# Patient Record
Sex: FEMALE | Race: WHITE | Employment: FULL TIME | URBAN - METROPOLITAN AREA
[De-identification: names, ages, dates, MRNs, and addresses within clinical notes are randomized per-mention and may not be internally consistent; named-entity substitution may affect disease eponyms.]

---

## 2017-03-14 ENCOUNTER — OFFICE VISIT - RIVER FALLS (OUTPATIENT)
Dept: FAMILY MEDICINE | Facility: CLINIC | Age: 47
End: 2017-03-14

## 2017-03-14 ASSESSMENT — MIFFLIN-ST. JEOR: SCORE: 1457.77

## 2021-05-25 ENCOUNTER — RECORDS - HEALTHEAST (OUTPATIENT)
Dept: ADMINISTRATIVE | Facility: CLINIC | Age: 51
End: 2021-05-25

## 2021-09-14 ENCOUNTER — OFFICE VISIT - RIVER FALLS (OUTPATIENT)
Dept: FAMILY MEDICINE | Facility: CLINIC | Age: 51
End: 2021-09-14
Payer: COMMERCIAL

## 2021-09-29 ENCOUNTER — OFFICE VISIT - RIVER FALLS (OUTPATIENT)
Dept: FAMILY MEDICINE | Facility: CLINIC | Age: 51
End: 2021-09-29
Payer: COMMERCIAL

## 2021-10-01 ENCOUNTER — COMMUNICATION - RIVER FALLS (OUTPATIENT)
Dept: FAMILY MEDICINE | Facility: CLINIC | Age: 51
End: 2021-10-01
Payer: COMMERCIAL

## 2021-10-01 LAB — BACTERIA SPEC CULT: NORMAL

## 2021-12-09 ENCOUNTER — OFFICE VISIT - RIVER FALLS (OUTPATIENT)
Dept: FAMILY MEDICINE | Facility: CLINIC | Age: 51
End: 2021-12-09
Payer: COMMERCIAL

## 2021-12-22 ENCOUNTER — OFFICE VISIT - RIVER FALLS (OUTPATIENT)
Dept: FAMILY MEDICINE | Facility: CLINIC | Age: 51
End: 2021-12-22
Payer: COMMERCIAL

## 2022-02-11 VITALS
BODY MASS INDEX: 27.12 KG/M2 | WEIGHT: 153.1 LBS | DIASTOLIC BLOOD PRESSURE: 80 MMHG | DIASTOLIC BLOOD PRESSURE: 75 MMHG | BODY MASS INDEX: 26.94 KG/M2 | HEART RATE: 64 BPM | WEIGHT: 152.1 LBS | HEART RATE: 86 BPM | SYSTOLIC BLOOD PRESSURE: 131 MMHG | OXYGEN SATURATION: 97 % | SYSTOLIC BLOOD PRESSURE: 114 MMHG | TEMPERATURE: 98.5 F

## 2022-02-11 VITALS
HEART RATE: 76 BPM | HEART RATE: 92 BPM | BODY MASS INDEX: 26.39 KG/M2 | DIASTOLIC BLOOD PRESSURE: 82 MMHG | DIASTOLIC BLOOD PRESSURE: 91 MMHG | SYSTOLIC BLOOD PRESSURE: 138 MMHG | SYSTOLIC BLOOD PRESSURE: 126 MMHG | BODY MASS INDEX: 25.31 KG/M2 | TEMPERATURE: 99.1 F | WEIGHT: 149 LBS | WEIGHT: 142.9 LBS

## 2022-02-11 VITALS
HEIGHT: 63 IN | WEIGHT: 191.5 LBS | SYSTOLIC BLOOD PRESSURE: 110 MMHG | BODY MASS INDEX: 33.93 KG/M2 | TEMPERATURE: 97.4 F | HEART RATE: 72 BPM | DIASTOLIC BLOOD PRESSURE: 70 MMHG

## 2022-02-16 NOTE — NURSING NOTE
Comprehensive Intake Entered On:  12/9/2021 9:26 AM CST    Performed On:  12/9/2021 9:20 AM CST by Eileen Brady CMA               Summary   Chief Complaint :   c/o burning with urination, lower abdominal discomfort x 4 days   Menstrual Status :   Menarcheal   Weight Measured :   149 lb(Converted to: 149 lb 0 oz, 67.585 kg)    Systolic Blood Pressure :   126 mmHg   Diastolic Blood Pressure :   82 mmHg (HI)    Mean Arterial Pressure :   97 mmHg   Peripheral Pulse Rate :   76 bpm   BP Site :   Right arm   BP Method :   Manual   HR Method :   Electronic   Languages :   English   Eileen Brady CMA - 12/9/2021 9:20 AM CST   Health Status   Allergies Verified? :   Yes   Medication History Verified? :   Yes   Medical History Verified? :   Yes   Pre-Visit Planning Status :   Completed   Tobacco Use? :   Former smoker   Eileen Brady CMA - 12/9/2021 9:20 AM CST   Consents   Consent for Immunization Exchange :   Consent Granted   Consent for Immunizations to Providers :   Consent Granted   Eileen Brady CMA - 12/9/2021 9:20 AM CST   Meds / Allergies   (As Of: 12/9/2021 9:26:39 AM CST)   Allergies (Active)   No known allergies  Estimated Onset Date:   Unspecified ; Created By:   Annalisa Baum; Reaction Status:   Active ; Category:   Drug ; Substance:   No known allergies ; Type:   Allergy ; Updated By:   Annalisa Baum; Reviewed Date:   9/29/2021 3:26 PM CDT        Medication List   (As Of: 12/9/2021 9:26:39 AM CST)   Prescription/Discharge Order    FLUoxetine  :   FLUoxetine ; Status:   Prescribed ; Ordered As Mnemonic:   FLUoxetine 20 mg oral capsule ; Simple Display Line:   1 cap(s), Oral, daily, 30 cap(s), 0 Refill(s) ; Ordering Provider:   Kushal Montenegro PA-C; Catalog Code:   FLUoxetine ; Order Dt/Tm:   10/12/2021 6:58:55 AM CDT

## 2022-02-16 NOTE — LETTER
(Inserted Image. Unable to display)   October 13, 2021    JAS BERMAN   [address] [place] [address]            Dear JAS,      Thank you for selecting [facility] for your healthcare needs.    Our records indicate you are due for the following services:     Follow-up office visit.    (FYI   Regarding office visits: In some instances, a video visit or telephone visit may be offered as an option.)      To schedule an appointment or if you have further questions, please contact your clinic at [phone].      Powered by [employer]    Sincerely,    Kushal Montenegro PA-C

## 2022-02-16 NOTE — TELEPHONE ENCOUNTER
---------------------  From: Juan Pablo Lares MD   To: JAS BERMAN    Sent: 9/29/2021 4:19:17 PM CDT  Subject: Initial urine     Dear Jas    Urine shows RBC (Red Blood cells) 0-2, WBC (white blood cells) 0-5 and few bacteria.    Angel Lares M.D.

## 2022-02-16 NOTE — TELEPHONE ENCOUNTER
Entered by Kushal Montenegro PA-C on October 12, 2021 6:59:00 AM CDT  ---------------------  From: Kushal Montenegro PA-C   To: [facility] #[id number]    Sent: 10/12/2021 6:59:00 AM CDT  Subject: Medication Management     ** Submitted: **  Complete:FLUoxetine (FLUoxetine 20 mg oral capsule)   Signed by Kushal Montenegro PA-C  10/12/2021 11:58:00 AM [facility]    ** Approved with modifications: **  FLUoxetine (FLUOXETINE 20MG CAPSULES)  TAKE ONE CAPSULE BY MOUTH EVERY DAY  Qty:  30 cap(s)        Days Supply:  30        Refills:  0          Substitutions Allowed     Route To Pharmacy - [facility] #[id number]   Note to Pharmacy:  Needs virtual visit              ------------------------------------------  From: [facility] #[id number]  To: Kushal Montenegro PA-C  Sent: October 12, 2021 3:42:06 AM CDT  Subject: Medication Management  Due: October 2, 2021 3:22:27 PM CDT     ** On Hold Pending Signature **     Dispensed Drug: FLUoxetine (FLUoxetine 20 mg oral capsule), TAKE ONE CAPSULE BY MOUTH EVERY DAY  Quantity: 30 cap(s)  Days Supply: 30  Refills: 0  Substitutions Allowed  Notes from Pharmacy:  ------------------------------------------

## 2022-02-16 NOTE — NURSING NOTE
Depression Screening Entered On:  9/14/2021 3:11 PM CDT    Performed On:  9/14/2021 3:10 PM CDT by Eileen Brady CMA               Depression Screening   Little Interest - Pleasure in Activities :   Nearly every day   Feeling Down, Depressed, Hopeless :   More than half the days   Initial Depression Screen Score :   5 Score   Poor Appetite or Overeating :   More than half the days   Trouble Falling or Staying Asleep :   Several days   Feeling Tired or Little Energy :   More than half the days   Feeling Bad About Yourself :   Several days   Trouble Concentrating :   Nearly every day   Moving or Speaking Slowly :   Not at all   Thoughts Better Off Dead or Hurting Self :   Not at all   Difficulty at Work, Home, Getting Along :   Somewhat difficult   Detailed Depression Screen Score :   9    Total Depression Screen Score :   14    Eileen Brady CMA - 9/14/2021 3:10 PM CDT

## 2022-02-16 NOTE — NURSING NOTE
Comprehensive Intake Entered On:  9/29/2021 3:28 PM CDT    Performed On:  9/29/2021 3:24 PM CDT by Samra Kimble               Summary   Chief Complaint :   c/o abd pain, urge to urinate, foul smelling urine- started this am    Menstrual Status :   Menarcheal   Weight Measured :   152.1 lb(Converted to: 152 lb 2 oz, 68.991 kg)    Systolic Blood Pressure :   114 mmHg   Diastolic Blood Pressure :   80 mmHg   Mean Arterial Pressure :   91 mmHg   Peripheral Pulse Rate :   64 bpm   BP Site :   Right arm   BP Method :   Manual   HR Method :   Electronic   Temperature Tympanic :   98.5 DegF(Converted to: 36.9 DegC)    Oxygen Saturation :   97 %   Languages :   English   Samra Kimble - 9/29/2021 3:24 PM CDT   Health Status   Allergies Verified? :   Yes   Medication History Verified? :   Yes   Medical History Verified? :   Yes   Pre-Visit Planning Status :   Completed   Tobacco Use? :   Former smoker   Samra Kimble - 9/29/2021 3:24 PM CDT   Consents   Consent for Immunization Exchange :   Consent Granted   Consent for Immunizations to Providers :   Consent Granted   Samra Kimble - 9/29/2021 3:24 PM CDT   Meds / Allergies   (As Of: 9/29/2021 3:28:49 PM CDT)   Allergies (Active)   No known allergies  Estimated Onset Date:   Unspecified ; Created By:   Annalisa Baum; Reaction Status:   Active ; Category:   Drug ; Substance:   No known allergies ; Type:   Allergy ; Updated By:   Annalisa Baum; Reviewed Date:   9/29/2021 3:26 PM CDT        Medication List   (As Of: 9/29/2021 3:28:49 PM CDT)   Prescription/Discharge Order    FLUoxetine  :   FLUoxetine ; Status:   Prescribed ; Ordered As Mnemonic:   FLUoxetine 20 mg oral capsule ; Simple Display Line:   20 mg, 1 cap(s), Oral, daily, 30 cap(s), 0 Refill(s) ; Ordering Provider:   Kushal Montenegro PA-C; Catalog Code:   FLUoxetine ; Order Dt/Tm:   9/15/2021 12:39:49 PM CDT

## 2022-02-16 NOTE — NURSING NOTE
Comprehensive Intake Entered On:  12/22/2021 2:28 PM CST    Performed On:  12/22/2021 2:25 PM CST by Eileen Brady CMA               Summary   Temperature Tympanic :   99.1 DegF(Converted to: 37.3 DegC)    Eileen Brady CMA - 12/22/2021 2:30 PM CST   Chief Complaint :   Swollen under chin since Saturday- painful; reoccuring 2-3 x a year; discuss fluoxetine   Menstrual Status :   Menarcheal   Weight Measured :   142.9 lb(Converted to: 142 lb 14 oz, 64.818 kg)    Systolic Blood Pressure :   138 mmHg (HI)    Diastolic Blood Pressure :   91 mmHg (HI)    Mean Arterial Pressure :   107 mmHg   Peripheral Pulse Rate :   92 bpm   BP Site :   Right arm   BP Method :   Electronic   HR Method :   Electronic   Languages :   English   Eileen Brady CMA - 12/22/2021 2:25 PM CST   Health Status   Allergies Verified? :   Yes   Medication History Verified? :   Yes   Medical History Verified? :   Yes   Eileen Brady CMA - 12/22/2021 2:25 PM CST   Consents   Consent for Immunization Exchange :   Consent Granted   Consent for Immunizations to Providers :   Consent Granted   Eileen Brady CMA - 12/22/2021 2:25 PM CST   Meds / Allergies   (As Of: 12/22/2021 2:28:03 PM CST)   Allergies (Active)   No known allergies  Estimated Onset Date:   Unspecified ; Created By:   Annalisa Baum; Reaction Status:   Active ; Category:   Drug ; Substance:   No known allergies ; Type:   Allergy ; Updated By:   Annalisa Baum; Reviewed Date:   12/22/2021 2:26 PM CST        Medication List   (As Of: 12/22/2021 2:28:03 PM CST)   Prescription/Discharge Order    FLUoxetine  :   FLUoxetine ; Status:   Prescribed ; Ordered As Mnemonic:   FLUoxetine 20 mg oral capsule ; Simple Display Line:   1 cap(s), Oral, daily, 30 cap(s), 0 Refill(s) ; Ordering Provider:   Kushal Montenegro PA-C; Catalog Code:   FLUoxetine ; Order Dt/Tm:   10/12/2021 6:58:55 AM CDT

## 2022-02-16 NOTE — PROGRESS NOTES
Patient:   JAS BERMAN            MRN: [id number]            FIN: [id number]               Age:   46 years     Sex:  Female     :  1970   Associated Diagnoses:   Panic attacks; CAITLIN (generalized anxiety disorder); GERD (gastroesophageal reflux disease)   Author:   Kushal Montenegro PA-C      Visit Information   Visit type:  General concerns.    Accompanied by:  No one.    Source of history:  Self.    Referral source:  Self.       Chief Complaint   3/14/2017 2:35 PM CDT    c/o increased anxiety  attacks x 2 months        History of Present Illness             The patient presents with anxiety.  The anxiety is characterized by irritability, nervousness and restlessness.  The severity of the anxiety is moderate.  The anxiety is episodic, fluctuates in intensity and is worsening.  The anxiety has lasted for 2 week(s).  Long standing history of anxiety. More GERD symptoms as of late. Just located her birth father and will be meeting him soon. GYN has her on low dose fluoxetine and uses PRN Ativan. No longer taking PPI. CC above noted and confirmed with the patient..        Review of Systems   Constitutional:  Negative.    Gastrointestinal:  Negative except as documented in history of present illness.    Psychiatric:  Negative except as documented in history of present illness.       Health Status   Allergies:    Allergic Reactions (All)  No known allergies   Medications:  (Selected)   Prescriptions  Prescribed  LORazepam 0.5 mg oral tablet: 1 tab(s) ( 0.5 mg ), PO, bid, PRN: for anxiety, # 30 tab(s), 0 Refill(s), Type: Maintenance  NexIUM 40 mg oral delayed release capsule: 1 cap(s) ( 40 mg ), PO, Daily, # 90 cap(s), 3 Refill(s), Type: Maintenance, Pharmacy: [facility] #[id number], 1 cap(s) po daily  Documented Medications  Documented  FLUoxetine 20 mg oral capsule: 1 cap(s) ( 20 mg ), po, hs, 0 Refill(s), Type: Maintenance   Problem list:    All Problems (Selected)  HISTORY OF TOBACCO USE / ICD-9-CM V15.82 /  Confirmed  Obese / ICD-9-.00 / Probable  Panic attacks / SNOMED CT 059707802 / Confirmed  Depression / SNOMED CT 028793869 / Confirmed      Histories   Past Medical History:    Active  HISTORY OF TOBACCO USE (V15.82)  Resolved  Moderate recurrent major depression (296.32):  Resolved.   Family History:    No family history items have been selected or recorded.   Procedure history:    stress test in the month of 2007 at 36 Years.  Comments:  2010 11:48 AM - Christa Larson  negative   delivery - delivered ([id number]).   x 2.   Social History:        Tobacco Assessment: Past      Home and Environment Assessment            Marital status: .  Spouse/Partner name: Allen - second marriage.        Physical Examination   Vital Signs   3/14/2017 2:35 PM CDT Temperature Temporal 97.4 DegF    Peripheral Pulse Rate 72 bpm    Systolic Blood Pressure 110 mmHg    Diastolic Blood Pressure 70 mmHg    Mean Arterial Pressure 83 mmHg    BP Site Left arm    BP Method Manual      Measurements from flowsheet : Measurements   3/14/2017 2:35 PM CDT Height Measured - Standard 63 in    Weight Measured - Standard 191.5 lb    BSA 1.96 m2    Body Mass Index 33.92 kg/m2      Neck:  Supple, Non-tender, No lymphadenopathy.    Respiratory:  Lungs are clear to auscultation, Respirations are non-labored, Breath sounds are equal.    Cardiovascular:  Normal rate, Regular rhythm, No murmur.    Gastrointestinal:  Soft, Non-tender, Non-distended, Normal bowel sounds, No organomegaly.    Psychiatric:  Cooperative.         Mood and affect: Anxious.         Behavior: Agitated.         Judgment: Able to make sensible decisions, Appropriate in social situations.         Abnormal / Psychotic thoughts: No homicidal ideation, No suicidal ideation.         Thought process: Appropriate.       Impression and Plan   Diagnosis     Panic attacks (PCF46-SM F41.0).     CAITLIN (generalized anxiety disorder) (MDT79-AR F41.1).     GERD  (gastroesophageal reflux disease) (DXY54-ST K21.9).     Patient Instructions:       Counseled: Patient, Regarding diagnosis, Regarding treatment, Regarding medications, Verbalized understanding.    Orders     Orders (Selected)   Prescriptions  Prescribed  NexIUM 40 mg oral delayed release capsule: 1 cap(s) ( 40 mg ), PO, Daily, # 90 cap(s), 3 Refill(s), Type: Maintenance, Pharmacy: [facility] #[id number], 1 cap(s) po daily.     Back on Nexium. She can use Ativan PRN. She sees GYN on Thursday. Wants to wait on psych Meds until after that visit. May consider putting her on Buspar in addition to current Meds if needed.

## 2022-02-16 NOTE — PROGRESS NOTES
Patient:   JAS BERMAN            MRN: [id number]            FIN: [id number]               Age:   51 years     Sex:  Female     :  1970   Associated Diagnoses:   Localized swelling, mass and lump, neck; Depression   Author:   Kushal Montenegro PA-C      Visit Information      Date of Service: 2021 02:19 pm  Performing Location: [facility]  Encounter#: [id number]      Primary Care Provider (PCP):  Kushal Montenegro PA-C    NPI# [id number]   Visit type:  General concerns.    Accompanied by:  No one.    Source of history:  Self.    Referral source:  Self.    History limitation:  None.       Chief Complaint   2021 2:25 PM CST   Swollen under chin since Saturday- painful; reoccuring 2-3 x a year; discuss fluoxetine        History of Present Illness             The patient presents with neck pain.  The location of the neck pain is midline, anterior.  The neck pain is described as aching and sharp.  The severity of the neck pain is moderate.  The neck pain is episodic, fluctuates in intensity and is improving.  The neck pain has lasted for 3 day(s).  Swelling in midline under chin. Better today. No fever or chills. Happens 2-3 times per year. Had US many years ago, told had thyroid nodule, BX benign. This swelling is superior to the thyroid. Odynophagia. No dysphagia. No rash, but some localized erythema. This is first time I have seen this patient for this condition. CC above noted and confirmed with the patient..  Refill Fluoxetine.        Review of Systems   Constitutional:  Negative.    Eye:  Negative.    Ear/Nose/Mouth/Throat:  Negative except as documented in history of present illness.    Respiratory:  Negative.    Cardiovascular:  Negative.    Gastrointestinal:  Negative.    Psychiatric:  Anxiety.       Health Status   Allergies:    Allergic Reactions (Selected)  No known allergies   Medications:  (Selected)   Prescriptions  Prescribed  FLUoxetine 20 mg oral capsule: = 1 cap(s), Oral,  daily, x 90 day(s), # 90 cap(s), 3 Refill(s), Type: Physician Stop, Pharmacy: [facility] #[id number], 1 cap(s) Oral daily,x90 day(s), 142.9, lb, 21 14:25:00 CST, Weight Measured  amoxicillin-clavulanate 875 mg-125 mg oral tablet: = 1 tab(s), Oral, q12 hrs, x 10 day(s), # 20 tab(s), 0 Refill(s), Type: Acute, Pharmacy: [facility] #[id number], 1 tab(s) Oral q12 hrs,x10 day(s), 142.9, lb, 21 14:25:00 CST, Weight Measured   Problem list:    All Problems (Selected)  Panic attacks / SNOMED CT 919019443 / Confirmed  Obese / ICD-9-.00 / Probable  HISTORY OF TOBACCO USE / ICD-9-CM V15.82 / Confirmed  Depression / SNOMED CT 308074764 / Confirmed      Histories   Past Medical History:    Active  HISTORY OF TOBACCO USE (V15.82)  Resolved  Moderate recurrent major depression (296.32):  Resolved.   Family History:    No family history items have been selected or recorded.   Procedure history:    stress test in the month of 2007 at 36 Years.  Comments:  2010 11:48 AM [facility] - Christa Larson  negative   delivery - delivered ([id number]).   x 2.   Social History:        Electronic Cigarette/Vaping Assessment            Electronic Cigarette Use: Never.      Tobacco Assessment: Past            Former smoker, quit more than 30 days ago      Home and Environment Assessment            Marital status: .  Spouse/Partner name: Allen - second marriage.        Physical Examination   Vital Signs   2021 2:25 PM CST Temperature Tympanic 99.1 DegF    Peripheral Pulse Rate 92 bpm    HR Method Electronic    Systolic Blood Pressure 138 mmHg  HI    Diastolic Blood Pressure 91 mmHg  HI    Mean Arterial Pressure 107 mmHg    BP Site Right arm    BP Method Electronic      Measurements from flowsheet : Measurements   2021 2:25 PM CST   Weight Measured - Standard                142.9 lb     HENT:  Tympanic membranes are clear, Oral mucosa is moist, No pharyngeal erythema.    Neck:  Supple,  Swelling right under chin in midline. Tender to touch. Mild erythema., No other swollen nodes in neck.    Respiratory:  Lungs are clear to auscultation, Respirations are non-labored.    Cardiovascular:  Normal rate, Regular rhythm.       Impression and Plan   Diagnosis     Localized swelling, mass and lump, neck (AGI16-KG R22.1).     Depression (CDM34-EJ F33.1).     Course:  Improving.    Patient Instructions:       Counseled: Patient, Regarding diagnosis, Regarding medications, Activity, Verbalized understanding.    Summary:  Rocephin 1 gram IM. To ED if problems swallowing, difficulty breathing or other concerns of fever, chills, etc..    Orders     Orders (Selected)   Outpatient Orders  Ordered  Referral (Request): 12/22/21 14:42:00 CST, Referred to: Otolaryngology (ENT), Referred to: [facility], Reason for referral: Neck swelling recurrent, Priority: Soon, Localized swelling, mass and lump, neck  US Head/Neck Soft Tissue (Request): Priority: Urgent, Localized swelling, mass and lump, neck  Prescriptions  Prescribed  FLUoxetine 20 mg oral capsule: = 1 cap(s), Oral, daily, x 90 day(s), # 90 cap(s), 3 Refill(s), Type: Physician Stop, Pharmacy: [facility] #[id number], 1 cap(s) Oral daily,x90 day(s), 142.9, lb, 12/22/21 14:25:00 CST, Weight Measured  amoxicillin-clavulanate 875 mg-125 mg oral tablet: = 1 tab(s), Oral, q12 hrs, x 10 day(s), # 20 tab(s), 0 Refill(s), Type: Acute, Pharmacy: [facility] #[id number], 1 tab(s) Oral q12 hrs,x10 day(s), 142.9, lb, 12/22/21 14:25:00 CST, Weight Measured.

## 2022-02-16 NOTE — NURSING NOTE
Comprehensive Intake Entered On:  9/14/2021 2:49 PM CDT    Performed On:  9/14/2021 2:45 PM CDT by Eileen Brady CMA               Summary   Chief Complaint :   Discuss restarting Fluoxetine   Menstrual Status :   Menarcheal   Weight Measured :   153.1 lb(Converted to: 153 lb 2 oz, 69.445 kg)    Systolic Blood Pressure :   131 mmHg (HI)    Diastolic Blood Pressure :   75 mmHg   Mean Arterial Pressure :   94 mmHg   Peripheral Pulse Rate :   86 bpm   BP Site :   Right arm   BP Method :   Electronic   HR Method :   Electronic   Languages :   English   Eileen Brady CMA - 9/14/2021 2:45 PM CDT   Health Status   Allergies Verified? :   Yes   Medication History Verified? :   Yes   Medical History Verified? :   Yes   Eileen Brady CMA - 9/14/2021 2:45 PM CDT   Consents   Consent for Immunization Exchange :   Consent Granted   Consent for Immunizations to Providers :   Consent Granted   Eileen Brady CMA - 9/14/2021 2:45 PM CDT   Meds / Allergies   (As Of: 9/14/2021 2:49:09 PM CDT)   Allergies (Active)   No known allergies  Estimated Onset Date:   Unspecified ; Created By:   Annalisa Baum; Reaction Status:   Active ; Category:   Drug ; Substance:   No known allergies ; Type:   Allergy ; Updated By:   Annalisa Baum; Reviewed Date:   9/14/2021 2:48 PM CDT        Medication List   (As Of: 9/14/2021 2:49:09 PM CDT)   Prescription/Discharge Order    raNITIdine  :   raNITIdine ; Status:   Processing ; Ordered As Mnemonic:   Zantac 300 oral tablet ; Ordering Provider:   Kushal Montenegro PA-C; Action Display:   Complete ; Catalog Code:   raNITIdine ; Order Dt/Tm:   9/14/2021 2:48:07 PM CDT          LORazepam  :   LORazepam ; Status:   Processing ; Ordered As Mnemonic:   LORazepam 0.5 mg oral tablet ; Ordering Provider:   Mitchel Pena MD; Action Display:   Complete ; Catalog Code:   LORazepam ; Order Dt/Tm:   9/14/2021 2:48:07 PM CDT            Home Meds    FLUoxetine  :   FLUoxetine ; Status:   Processing ; Ordered As  Mnemonic:   FLUoxetine 20 mg oral capsule ; Action Display:   Complete ; Catalog Code:   FLUoxetine ; Order Dt/Tm:   9/14/2021 2:48:07 PM CDT            Social History   Social History   (As Of: 9/14/2021 2:49:09 PM CDT)   Tobacco:  Past      Former smoker, quit more than 30 days ago   (Last Updated: 9/14/2021 2:47:58 PM CDT by Eileen Brady CMA)          Electronic Cigarette/Vaping:        Electronic Cigarette Use: Never.   (Last Updated: 9/14/2021 2:48:03 PM CDT by Eileen Brady CMA)          Home/Environment:        Marital status: .  Spouse/Partner name: Allen - second marriage.   (Last Updated: 10/7/2014 6:31:50 AM CDT by Bisi Mesa)

## 2022-02-16 NOTE — PROGRESS NOTES
Patient:   JAS BERMAN            MRN: [id number]            FIN: [id number]               Age:   51 years     Sex:  Female     :  1970   Associated Diagnoses:   Acute cystitis with hematuria   Author:   Kushal Montenegro PA-C      Visit Information      Date of Service: 2021 09:20 am  Performing Location: [facility]  Encounter#: [id number]   Visit type:  New symptom.    Accompanied by:  No one.    Source of history:  Self.    History limitation:  None.       Chief Complaint   2021 9:20 AM CST    c/o burning with urination, lower abdominal discomfort x 4 days      History of Present Illness             The patient presents with dysuria.  The dysuria is characterized by burning and a sensation of bladder fullness.  The severity of the dysuria is moderate.  The dysuria is constant.  The dysuria has lasted for 4 day(s).  Continues  on Fluoxetine and feels much improved..  Associated symptoms consist of urinary frequency and urinary urgency.        Review of Systems   Constitutional:  Negative.    Gastrointestinal:  Negative.    Genitourinary:  Negative except as documented in history of present illness.       Health Status   Allergies:    Allergic Reactions (Selected)  No known allergies   Medications:  (Selected)   Prescriptions  Prescribed  FLUoxetine 20 mg oral capsule: = 1 cap(s), Oral, daily, # 30 cap(s), 0 Refill(s), Pharmacy: [facility] #[id number], TAKE ONE CAPSULE BY MOUTH EVERY DAY, 152.1, lb, 21 15:24:00 CDT, Weight Measured  Macrobid 100 mg oral capsule: = 1 cap(s) ( 100 mg ), Oral, bid, x 7 day(s), # 14 cap(s), 0 Refill(s), Type: Acute, Pharmacy: [facility] #[id number], 1 cap(s) Oral bid,x7 day(s), 149, lb, 21 9:20:00 CST, Weight Measured   Problem list:    All Problems  HISTORY OF TOBACCO USE / ICD-9-CM V15.82 / Confirmed  Obese / ICD-9-.00 / Probable  Panic attacks / SNOMED CT 994093469 / Confirmed  Depression / SNOMED CT 079013873 /  Confirmed  Inactive: History of thyroid nodule / SNOMED CT 234873651  Resolved: Moderate recurrent major depression / ICD-9-.32      Histories   Past Medical History:    Active  HISTORY OF TOBACCO USE (V15.82)  Resolved  Moderate recurrent major depression (296.32):  Resolved.   Family History:    No family history items have been selected or recorded.   Procedure history:    stress test in the month of 2007 at 36 Years.  Comments:  2010 11:48 AM CDT - Christa Larson  negative   delivery - delivered ([id number]).   x 2.   Social History:        Electronic Cigarette/Vaping Assessment            Electronic Cigarette Use: Never.      Tobacco Assessment: Past            Former smoker, quit more than 30 days ago      Home and Environment Assessment            Marital status: .  Spouse/Partner name: Allen - second marriage.        Physical Examination   Vital Signs   Measurements from flowsheet : Measurements   General:  Alert and oriented, No acute distress.    Respiratory:  Lungs are clear to auscultation, Respirations are non-labored, Breath sounds are equal.    Cardiovascular:  Normal rate, Regular rhythm.    Gastrointestinal:  Soft, Non-tender, Non-distended, Normal bowel sounds, No organomegaly.    Genitourinary:  No costovertebral angle tenderness.       Review / Management   Results review:  UA positive.       Impression and Plan   Diagnosis     Acute cystitis with hematuria (REV83-MH N30.01).     Course:  Progressing as expected.    Plan:  Macrobid BID x7 days. Continue drinking cranberry juice. RTC if sx worsen or persist and PRN Note composed by Paulo MCKINLEY. The history, exam were confirmed and performed by me. The assessment and plan were developed and confirmed by myself. .         Diet: Fluids encouraged.    Patient Instructions:       Counseled: Patient, Regarding diagnosis, Regarding treatment, Regarding medications, Regarding activity, Verbalized understanding.    Orders      Orders (Selected)   Prescriptions  Prescribed  Macrobid 100 mg oral capsule: = 1 cap(s) ( 100 mg ), Oral, bid, x 7 day(s), # 14 cap(s), 0 Refill(s), Type: Acute, Pharmacy: [facility] #[id number], 1 cap(s) Oral bid,x7 day(s), 149, lb, 12/09/21 9:20:00 CST, Weight Measured.

## 2022-02-16 NOTE — NURSING NOTE
Generalized Anxiety Disorder Screening Entered On:  9/14/2021 3:11 PM CDT    Performed On:  9/14/2021 3:10 PM CDT by Eileen Brady CMA               CAITLIN-7   CAITLIN Nervous, Anxious On Edge :   Nearly every day   CAITLIN Control Worrying B :   Nearly every day   CAITLIN Worrying Too Much :   Nearly every day   CAITLIN Trouble Relaxing :   Nearly every day   CAITLIN Restless :   More than half the days   CAITLIN Easily Annoyed/Irritable :   More than half the days   CAITLIN Afraid :   Nearly every day   CAITLIN Total Screening Score :   19    CAITLIN Difficulty with Work, Home, Others :   Somewhat difficult   Eileen Brady CMA - 9/14/2021 3:10 PM CDT

## 2022-02-16 NOTE — PROGRESS NOTES
Chief Complaint    c/o abd pain, urge to urinate, foul smelling urine- started this am  History of Present Illness       Feeling like something not right past 1-2 weeks. Last night started having discomfort in suprapubic area. Having foul smelling urine. Uncomfortable with urinating. Having urinary urgency and frequency.       Last UTI many years ago.  Review of Systems       No fevers       No vomiting       No vaginal itching, burning and discharge  Physical Exam   Vitals & Measurements    T: 98.5  F (Tympanic)  HR: 64 (Peripheral)  BP: 114/80  SpO2: 97%     WT: 152.1 lb        General: No acute distress       Musculoskeletal: Normal gait       Back: No CVA tenderness       Cardiac: Regular rate and rhythm       Abdomen: Soft and nondistended.  Mild tenderness in the suprapubic region       UA: RBC 0-2 with WBC 0-5 and few bacteria  Assessment/Plan       UTI: Treat with Keflex.  Await culture and return if not getting better.  Patient Information     Name:JAS BERMAN      Address:      [address] [id number]     Sex:Female     YOB: 1970     [phone]     MRN:[id number]     FIN:[id number]     Location:[facility]     Date of Service:2021      Primary Care Physician:       Kushal Montenegro PA-C, [phone]      Attending Physician:       Juan Pablo Lares MD, [phone]  Problem List/Past Medical History    Ongoing     Depression     History of thyroid nodule     HISTORY OF TOBACCO USE     Obese     Panic attacks    Historical     Moderate recurrent major depression  Procedure/Surgical History     stress test (2007)      Comments: negative.      delivery - delivered      x 2  Medications    FLUoxetine 20 mg oral capsule, 20 mg= 1 cap(s), Oral, daily  Allergies    No known allergies

## 2022-02-16 NOTE — TELEPHONE ENCOUNTER
---------------------  From: Juan Pablo Lares MD   To: JAS BERMAN    Sent: 10/1/2021 4:07:02 PM CDT  Subject: Urine culture     Dear Jas    Urine culture with no specific bacterial growth. Follow up if not improving.    Results:  Date Result Name Value   9/29/2021 3:41 PM Urine Culture See comment     Angel Lares M.D.

## 2022-02-16 NOTE — PROGRESS NOTES
Patient:   JAS BERMAN            MRN: [id number]            FIN: [id number]               Age:   50 years     Sex:  Female     :  1970   Associated Diagnoses:   CAITLIN (generalized anxiety disorder); Depression   Author:   Kushal Montenegro PA-C      Visit Information      Date of Service: 2021 02:40 pm  Performing Location: [facility]  Encounter#: [id number]      Primary Care Provider (PCP):  Kushal Montenegro PA-C    NPI# [id number]      Referring Provider:  Kushal Montenegro PA-C# [id number]   Visit type:  General concerns.    Accompanied by:  No one.    Source of history:  Self, Medical record.    Referral source:  Self.    History limitation:  None.       Chief Complaint   2021 2:45 PM CDT    Discuss restarting Fluoxetine        Interval History   Depression   Side effects of medication.  The course is worsening.  Compliance problems: No longer on medications. Multiple life stressors. and Mother  unexpectedly. Youngest son moving to [place]. Work is stressful. Did well on Fluoxetine for a number of years. Has been off about 2 years. Not suicidal. CC above noted and confirmed with the patient..  Associated symptoms characterized by no suicidal thoughts.        Review of Systems   Constitutional:  Negative.    Musculoskeletal:  Negative except as documented in history of present illness.    Integumentary:  Negative.    Neurologic:  Negative.    Psychiatric:  Negative except as documented in history of present illness.       Health Status   Allergies:    Allergic Reactions (Selected)  No known allergies   Medications:  (Selected)   Prescriptions  Prescribed  FLUoxetine 20 mg oral tablet: = 1 tab(s) ( 20 mg ), Oral, daily, # 30 tab(s), 0 Refill(s), Type: Maintenance, Pharmacy: [facility] #[id number], 1 tab(s) Oral daily, 153.1, lb, 21 14:45:00 CDT, Weight Measured,    Medications          *denotes recorded medication          FLUoxetine 20 mg oral tablet: 20 mg, 1 tab(s),  Oral, daily, 30 tab(s), 0 Refill(s).       Problem list:    All Problems (Selected)  Panic attacks / SNOMED CT 348543766 / Confirmed  Obese / ICD-9-.00 / Probable  HISTORY OF TOBACCO USE / ICD-9-CM V15.82 / Confirmed  Depression / SNOMED CT 257779841 / Confirmed      Histories   Past Medical History:    Active  HISTORY OF TOBACCO USE (V15.82)  Resolved  Moderate recurrent major depression (296.32):  Resolved.   Family History:    No family history items have been selected or recorded.   Procedure history:    stress test in the month of 2007 at 36 Years.  Comments:  2010 11:48 AM CDT - ArnulfoamishaShayy marinnne  negative   delivery - delivered ([id number]).   x 2.   Social History:        Electronic Cigarette/Vaping Assessment            Electronic Cigarette Use: Never.      Tobacco Assessment: Past            Former smoker, quit more than 30 days ago      Home and Environment Assessment            Marital status: .  Spouse/Partner name: Allen - second marriage.        Physical Examination   Vital Signs   2021 2:45 PM CDT Peripheral Pulse Rate 86 bpm    HR Method Electronic    Systolic Blood Pressure 131 mmHg  HI    Diastolic Blood Pressure 75 mmHg    Mean Arterial Pressure 94 mmHg    BP Site Right arm    BP Method Electronic      Measurements from flowsheet : Measurements   2021 2:45 PM CDT    Weight Measured - Standard                153.1 lb     Respiratory:  Respirations are non-labored.    Cardiovascular:  Normal rate.    Psychiatric:  Cooperative, Non-suicidal, No Abnormal / Psychotic thoughts.         Mood and affect: Flat, Tearful.         Behavior: Relaxed.         Thought process: Appropriate.       Impression and Plan   Diagnosis     CAITLIN (generalized anxiety disorder) (TNH46-OA F41.1).     Depression (YDL54-QU F33.1).     Course:  Worsening.    Patient Instructions:       Counseled: Patient, Regarding diagnosis, Regarding treatment, Regarding medications, Verbalized  understanding.    Orders     Orders (Selected)   Outpatient Orders  Ordered  Referral (Request): 09/14/21 15:09:00 CDT, Referred to: Other, Referred to: Maria G Amanda, Reason for referral: Depression, CAITLIN, Priority: Soon, CAITLIN (generalized anxiety disorder)  Depression  Prescriptions  Prescribed  FLUoxetine 20 mg oral tablet: = 1 tab(s) ( 20 mg ), Oral, daily, # 30 tab(s), 0 Refill(s), Type: Maintenance, Pharmacy: [facility] #[id number], 1 tab(s) Oral daily, 153.1, lb, 09/14/21 14:45:00 CDT, Weight Measured.     RTC ordered. May be virtual if doing well. May need to increase to 30 mg depending on response. Side effects explained. FU prior if worsening symptoms or any suicidal ideation.

## 2022-07-25 ENCOUNTER — OFFICE VISIT (OUTPATIENT)
Dept: FAMILY MEDICINE | Facility: CLINIC | Age: 52
End: 2022-07-25
Payer: COMMERCIAL

## 2022-07-25 VITALS — OXYGEN SATURATION: 98 % | HEART RATE: 65 BPM

## 2022-07-25 DIAGNOSIS — S93.601A SPRAIN OF RIGHT FOOT, INITIAL ENCOUNTER: Primary | ICD-10-CM
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
## 2022-07-25 PROBLEM — E66.9 OBESITY: Status: ACTIVE | Noted: 2022-07-25

## 2022-07-25 PROBLEM — F41.0 PANIC ATTACK: Status: ACTIVE | Noted: 2022-07-25

## 2022-07-25 PROBLEM — F33.1 MODERATE EPISODE OF RECURRENT MAJOR DEPRESSIVE DISORDER (H): Status: ACTIVE | Noted: 2022-07-25

## 2022-07-25 PROCEDURE — 99213 OFFICE O/P EST LOW 20 MIN: CPT | Performed by: FAMILY MEDICINE

## 2022-07-25 NOTE — PROGRESS NOTES
Assessment & Plan     Sprain of right foot, initial encounter  Patient with right foot sprain, x-ray reviewed by me and I do not see any evidence of a fracture.  We will call patient with the results of her x-ray once it is  read.  Discussed treatment for sprain which is rest and elevation, compression and immobilization, ice and anti-inflammatories.  She should remain nonweightbearing until she can put weight on it without it being painful.  I have recommended use of crutches or a knee scooter and she will look to see if there are friends or family who can provide crutches.  If not she has been given a prescription and can check with [facility].  I also let her know that if a knee scooter would work for her better I can place an order for that as well.  She should remain off of it for the next week and then slowly resume activity.  Follow-up in 7 to 10 days if not seeing significant improvement.  - Crutches Order for DME - ONLY FOR DME  - Ankle/Foot Bracing Supplies Order for DME - ONLY FOR DME    Fall down stairs, initial encounter  Because of her underlying sprain.  No evidence of any other injuries.  Follow-up as needed  - XR Foot Right G/E 3 Views; Future                 Return in about 1 week (around 8/1/2022) for Follow up.    Andria Morrow MD  [facility] [place]    Master Delacruz is a 51 year old accompanied by her spouse, presenting for the following health issues:  Fall (Yesterday 07/24, down the stairs, RT foot.)      History of Present Illness       Reason for visit:  Fell on stairs injured foot  Symptom onset:  1-3 days ago    She eats 0-1 servings of fruits and vegetables daily.She consumes 1 sweetened beverage(s) daily.She exercises with enough effort to increase her heart rate 9 or less minutes per day.  She exercises with enough effort to increase her heart rate 3 or less days per week.   She is taking medications regularly.     Patient with a fall at home  yesterday.  She was walking down the stairs and on the last step she had an eversion of her ankle.  Pain is at the top of her foot with bruising present.  She has been having a very difficult time standing on the foot or walking.  If she walks on her heel there is some improvement in the pain.  She has not tried anything else.  Was unable to walk today    Concern - Fall  Onset: 07/24  Description: fell down stairs foot went inward.  Intensity: 10/10 when weight bearing, 1/10 at rest, cannot put any weight on foot, which causes severe pain.  Progression of Symptoms:  improving  Accompanying Signs & Symptoms: swelling, bruising.  Previous history of similar problem: none  Precipitating factors:        Worsened by: standing, walking  Alleviating factors:        Improved by: sitting, relaxing.  Therapies tried and outcome: None        Review of Systems         Objective    Pulse 65   SpO2 98%   There is no height or weight on file to calculate BMI.  Physical Exam   alert and cooperative in no acute distress   On exam of her foot she is swollen and tender across the top of the foot, no point tenderness noted.  Has preserved range of motion but cannot tolerate standing on her foot      Xray - Reviewed and interpreted by me and reviewed with patient.  No evidence of any fracture or dislocation seen.  Final results will be done by radiology                .  ..

## 2022-08-29 ENCOUNTER — OFFICE VISIT (OUTPATIENT)
Dept: FAMILY MEDICINE | Facility: CLINIC | Age: 52
End: 2022-08-29

## 2022-08-29 ENCOUNTER — ANCILLARY PROCEDURE (OUTPATIENT)
Dept: GENERAL RADIOLOGY | Facility: CLINIC | Age: 52
End: 2022-08-29
Attending: PHYSICIAN ASSISTANT
Payer: COMMERCIAL

## 2022-08-29 VITALS
BODY MASS INDEX: 26.39 KG/M2 | SYSTOLIC BLOOD PRESSURE: 112 MMHG | DIASTOLIC BLOOD PRESSURE: 68 MMHG | WEIGHT: 149 LBS | HEART RATE: 66 BPM

## 2022-08-29 DIAGNOSIS — M79.604 PAIN OF RIGHT LOWER EXTREMITY: Primary | ICD-10-CM

## 2022-08-29 DIAGNOSIS — M79.604 PAIN OF RIGHT LOWER EXTREMITY: ICD-10-CM

## 2022-08-29 PROCEDURE — 73630 X-RAY EXAM OF FOOT: CPT | Mod: TC | Performed by: RADIOLOGY

## 2022-08-29 PROCEDURE — 99213 OFFICE O/P EST LOW 20 MIN: CPT | Performed by: PHYSICIAN ASSISTANT

## 2022-08-29 ASSESSMENT — ENCOUNTER SYMPTOMS
NUMBNESS: 0
JOINT SWELLING: 1
COLOR CHANGE: 0
PARESTHESIAS: 0
ARTHRALGIAS: 1

## 2022-08-29 ASSESSMENT — PATIENT HEALTH QUESTIONNAIRE - PHQ9
SUM OF ALL RESPONSES TO PHQ QUESTIONS 1-9: 0
10. IF YOU CHECKED OFF ANY PROBLEMS, HOW DIFFICULT HAVE THESE PROBLEMS MADE IT FOR YOU TO DO YOUR WORK, TAKE CARE OF THINGS AT HOME, OR GET ALONG WITH OTHER PEOPLE: NOT DIFFICULT AT ALL
SUM OF ALL RESPONSES TO PHQ QUESTIONS 1-9: 0

## 2022-08-29 NOTE — PROGRESS NOTES
Assessment & Plan     Pain of right lower extremity  X-ray unremarkable plan continue cam walker as needed.  Should be 100% in 2 weeks if not re evaluate I did offer podiatry today she declines  - XR Foot Right G/E 3 Views                   No follow-ups on file.    AGUS Garrido  [facility] [place]    Master Delacruz is a 51 year old, presenting for the following health issues:  Follow Up (Foot Injury)      51-year-old female presents the clinic to reevaluate for right foot injury she initially sprained her foot on around 25 July she still has pain and swelling  The ecchymosis resolved about a week ago  The pain is in lateral midfoot  She is able to bear weight but it is painful she has been out of the cam walker for about a week  No past history of injury to this foot    History of Present Illness       Reason for visit:  Foot injury   She is taking medications regularly.    Today's PHQ-9         PHQ-9 Total Score: 0    PHQ-9 Q9 Thoughts of better off dead/self-harm past 2 weeks :   Not at all    How difficult have these problems made it for you to do your work, take care of things at home, or get along with other people: Not difficult at all     Still has swelling and discomfort on right side of foot.  Would like to recheck x-ray for hairline fractures        Review of Systems   Musculoskeletal: Positive for arthralgias, gait problem and joint swelling.   Skin: Negative for color change and pallor.   Neurological: Negative for numbness and paresthesias.            Objective    /68 (BP Location: Right arm, Cuff Size: Adult Regular)   Pulse 66   Wt 67.6 kg (149 lb)   BMI 26.39 kg/m    Body mass index is 26.39 kg/m .  Physical Exam  Constitutional:       Appearance: Normal appearance.   Cardiovascular:      Pulses: Normal pulses.   Musculoskeletal:      Comments: Tender of the midfoot just medial to the fifth metatarsal head soft tissue swelling is noted at this area  No  metatarsal tenderness  No ankle tenderness   Skin:     General: Skin is warm and dry.      Capillary Refill: Capillary refill takes less than 2 seconds.      Findings: No bruising or erythema.   Neurological:      General: No focal deficit present.      Mental Status: She is alert.                   Results for orders placed or performed in visit on 08/29/22   XR Foot Right G/E 3 Views     Status: None    Narrative    EXAM: XR FOOT RIGHT G/E 3 VIEWS  LOCATION: [facility]  DATE/TIME: 8/29/2022 11:28 AM    INDICATION:  Pain of right lower extremity  COMPARISON: None.      Impression    IMPRESSION: Degenerative change first MTP joint. Right foot negative for fracture.           .  ..

## 2022-12-16 DIAGNOSIS — F33.1 MODERATE EPISODE OF RECURRENT MAJOR DEPRESSIVE DISORDER (H): Primary | ICD-10-CM

## 2022-12-19 NOTE — TELEPHONE ENCOUNTER
"      Last office visit: 8/29/2022     Future Appointments 12/19/2022 - 6/17/2023    None          Requested Prescriptions   Pending Prescriptions Disp Refills     FLUoxetine (PROZAC) 20 MG capsule 90 capsule 0     Sig: Take 1 capsule (20 mg) by mouth daily       SSRIs Protocol Passed - 12/16/2022  3:57 PM        Passed - Recent (12 mo) or future (30 days) visit within the authorizing provider's specialty     Patient has had an office visit with the authorizing provider or a provider within the authorizing providers department within the previous 12 mos or has a future within next 30 days. See \"Patient Info\" tab in inbasket, or \"Choose Columns\" in Meds & Orders section of the refill encounter.              Passed - Medication is active on med list        Passed - Patient is age 18 or older        Passed - No active pregnancy on record        Passed - No positive pregnancy test in last 12 months                   "

## 2023-02-04 ENCOUNTER — HEALTH MAINTENANCE LETTER (OUTPATIENT)
Age: 53
End: 2023-02-04

## 2023-03-18 DIAGNOSIS — F33.1 MODERATE EPISODE OF RECURRENT MAJOR DEPRESSIVE DISORDER (H): ICD-10-CM

## 2023-03-20 NOTE — TELEPHONE ENCOUNTER
"Routing refill request to provider for review/approval because:  PHQ-9 score is greater than 6 months old  Pt needs to be seen, been longer than 6months    Last Written Prescription Date:  12/9/22  Last Fill Quantity: 90,  # refills: 0   Last office visit provider:  8/29/22    Requested Prescriptions   Pending Prescriptions Disp Refills     FLUoxetine (PROZAC) 20 MG capsule [Pharmacy Med Name: FLUOXETINE 20MG CAPSULES] 90 capsule 0     Sig: TAKE 1 CAPSULE(20 MG) BY MOUTH DAILY       SSRIs Protocol Failed - 3/20/2023 12:49 PM        Failed - PHQ-9 score less than 5 in past 6 months     Please review last PHQ-9 score.           Failed - Recent (6 mo) or future (30 days) visit within the authorizing provider's specialty     Patient had office visit in the last 6 months or has a visit in the next 30 days with authorizing provider or within the authorizing provider's specialty.  See \"Patient Info\" tab in inbasket, or \"Choose Columns\" in Meds & Orders section of the refill encounter.            Passed - Medication is active on med list        Passed - Patient is age 18 or older        Passed - No active pregnancy on record        Passed - No positive pregnancy test in last 12 months             Melinda Jesus RN 03/20/23 12:49 PM  "

## 2023-06-18 DIAGNOSIS — F33.1 MODERATE EPISODE OF RECURRENT MAJOR DEPRESSIVE DISORDER (H): ICD-10-CM

## 2023-06-19 NOTE — TELEPHONE ENCOUNTER
Routing to provider as refill request for review/approval because:      LOV 8/29/22    Candy Crain RN  [facility]

## 2023-09-15 DIAGNOSIS — F33.1 MODERATE EPISODE OF RECURRENT MAJOR DEPRESSIVE DISORDER (H): ICD-10-CM

## 2023-12-14 DIAGNOSIS — F33.1 MODERATE EPISODE OF RECURRENT MAJOR DEPRESSIVE DISORDER (H): ICD-10-CM

## 2024-03-03 ENCOUNTER — HEALTH MAINTENANCE LETTER (OUTPATIENT)
Age: 54
End: 2024-03-03

## 2024-03-15 DIAGNOSIS — F33.1 MODERATE EPISODE OF RECURRENT MAJOR DEPRESSIVE DISORDER (H): ICD-10-CM

## 2024-04-15 DIAGNOSIS — F33.1 MODERATE EPISODE OF RECURRENT MAJOR DEPRESSIVE DISORDER (H): ICD-10-CM

## 2024-05-15 DIAGNOSIS — F33.1 MODERATE EPISODE OF RECURRENT MAJOR DEPRESSIVE DISORDER (H): ICD-10-CM

## 2024-06-24 DIAGNOSIS — F33.1 MODERATE EPISODE OF RECURRENT MAJOR DEPRESSIVE DISORDER (H): ICD-10-CM

## 2024-07-24 DIAGNOSIS — F33.1 MODERATE EPISODE OF RECURRENT MAJOR DEPRESSIVE DISORDER (H): ICD-10-CM

## 2025-02-15 ENCOUNTER — HEALTH MAINTENANCE LETTER (OUTPATIENT)
Age: 55
End: 2025-02-15

## 2025-03-15 ENCOUNTER — HEALTH MAINTENANCE LETTER (OUTPATIENT)
Age: 55
End: 2025-03-15

## 2025-06-04 ENCOUNTER — TELEPHONE (OUTPATIENT)
Dept: FAMILY MEDICINE | Facility: CLINIC | Age: 55
End: 2025-06-04
Payer: COMMERCIAL

## 2025-06-04 NOTE — TELEPHONE ENCOUNTER
Order/Referral Request    Who is requesting: Patient    Orders being requested: Mammogram     Reason service is needed/diagnosis: Patient would like to get this done     When are orders needed by: asap    Has this been discussed with Provider: No    Does patient have a preference on a Group/Provider/Facility? [facility]    Does patient have an appointment scheduled?: No    Where to send orders: Place orders within Epic    Could we send this information to you in [facility] or would you prefer to receive a phone call?:   Patient would prefer a phone call   Okay to leave a detailed message?: Yes at Cell number on file:    Telephone Information:   Mobile [phone]

## 2025-06-05 ENCOUNTER — ANCILLARY PROCEDURE (OUTPATIENT)
Dept: MAMMOGRAPHY | Facility: CLINIC | Age: 55
End: 2025-06-05
Payer: COMMERCIAL

## 2025-06-05 DIAGNOSIS — Z12.31 VISIT FOR SCREENING MAMMOGRAM: ICD-10-CM

## 2025-06-09 ENCOUNTER — RESULTS FOLLOW-UP (OUTPATIENT)
Dept: FAMILY MEDICINE | Facility: CLINIC | Age: 55
End: 2025-06-09

## 2025-07-06 SDOH — HEALTH STABILITY: PHYSICAL HEALTH: ON AVERAGE, HOW MANY DAYS PER WEEK DO YOU ENGAGE IN MODERATE TO STRENUOUS EXERCISE (LIKE A BRISK WALK)?: 0 DAYS

## 2025-07-06 ASSESSMENT — ANXIETY QUESTIONNAIRES
2. NOT BEING ABLE TO STOP OR CONTROL WORRYING: NOT AT ALL
5. BEING SO RESTLESS THAT IT IS HARD TO SIT STILL: NOT AT ALL
GAD7 TOTAL SCORE: 0
GAD7 TOTAL SCORE: 0
1. FEELING NERVOUS, ANXIOUS, OR ON EDGE: NOT AT ALL
4. TROUBLE RELAXING: NOT AT ALL
GAD7 TOTAL SCORE: 0
6. BECOMING EASILY ANNOYED OR IRRITABLE: NOT AT ALL
7. FEELING AFRAID AS IF SOMETHING AWFUL MIGHT HAPPEN: NOT AT ALL
3. WORRYING TOO MUCH ABOUT DIFFERENT THINGS: NOT AT ALL
7. FEELING AFRAID AS IF SOMETHING AWFUL MIGHT HAPPEN: NOT AT ALL

## 2025-07-06 ASSESSMENT — PATIENT HEALTH QUESTIONNAIRE - PHQ9
SUM OF ALL RESPONSES TO PHQ QUESTIONS 1-9: 0
SUM OF ALL RESPONSES TO PHQ QUESTIONS 1-9: 0

## 2025-07-06 ASSESSMENT — SOCIAL DETERMINANTS OF HEALTH (SDOH): HOW OFTEN DO YOU GET TOGETHER WITH FRIENDS OR RELATIVES?: ONCE A WEEK

## 2025-07-07 ENCOUNTER — OFFICE VISIT (OUTPATIENT)
Dept: FAMILY MEDICINE | Facility: CLINIC | Age: 55
End: 2025-07-07
Payer: COMMERCIAL

## 2025-07-07 VITALS
OXYGEN SATURATION: 98 % | SYSTOLIC BLOOD PRESSURE: 96 MMHG | HEIGHT: 64 IN | HEART RATE: 77 BPM | WEIGHT: 168.5 LBS | BODY MASS INDEX: 28.77 KG/M2 | TEMPERATURE: 97.6 F | RESPIRATION RATE: 12 BRPM | DIASTOLIC BLOOD PRESSURE: 56 MMHG

## 2025-07-07 DIAGNOSIS — Z13.1 SCREENING FOR DIABETES MELLITUS: ICD-10-CM

## 2025-07-07 DIAGNOSIS — Z12.4 CERVICAL CANCER SCREENING: ICD-10-CM

## 2025-07-07 DIAGNOSIS — Z13.6 SCREENING FOR CARDIOVASCULAR CONDITION: ICD-10-CM

## 2025-07-07 DIAGNOSIS — Z00.00 ROUTINE GENERAL MEDICAL EXAMINATION AT A HEALTH CARE FACILITY: Primary | ICD-10-CM

## 2025-07-07 PROCEDURE — 87624 HPV HI-RISK TYP POOLED RSLT: CPT | Performed by: FAMILY MEDICINE

## 2025-07-07 PROCEDURE — 90471 IMMUNIZATION ADMIN: CPT | Performed by: FAMILY MEDICINE

## 2025-07-07 PROCEDURE — 3074F SYST BP LT 130 MM HG: CPT | Performed by: FAMILY MEDICINE

## 2025-07-07 PROCEDURE — G0145 SCR C/V CYTO,THINLAYER,RESCR: HCPCS | Performed by: FAMILY MEDICINE

## 2025-07-07 PROCEDURE — 90715 TDAP VACCINE 7 YRS/> IM: CPT | Performed by: FAMILY MEDICINE

## 2025-07-07 PROCEDURE — 3078F DIAST BP <80 MM HG: CPT | Performed by: FAMILY MEDICINE

## 2025-07-07 PROCEDURE — 99396 PREV VISIT EST AGE 40-64: CPT | Mod: 25 | Performed by: FAMILY MEDICINE

## 2025-07-07 NOTE — PATIENT INSTRUCTIONS
Patient Education   Preventive Care Advice   This is general advice given by our system to help you stay healthy. However, your care team may have specific advice just for you. Please talk to your care team about your preventive care needs.  Nutrition  Eat 5 or more servings of fruits and vegetables each day.  Try wheat bread, brown rice and whole grain pasta (instead of white bread, rice, and pasta).  Get enough calcium and vitamin D. Check the label on foods and aim for 100% of the RDA (recommended daily allowance).  Lifestyle  Exercise at least 150 minutes each week  (30 minutes a day, 5 days a week).  Do muscle strengthening activities 2 days a week. These help control your weight and prevent disease.  No smoking.  Wear sunscreen to prevent skin cancer.  Have a dental exam and cleaning every 6 months.  Yearly exams  See your health care team every year to talk about:  Any changes in your health.  Any medicines your care team has prescribed.  Preventive care, family planning, and ways to prevent chronic diseases.  Shots (vaccines)   HPV shots (up to age 26), if you've never had them before.  Hepatitis B shots (up to age 59), if you've never had them before.  COVID-19 shot: Get this shot when it's due.  Flu shot: Get a flu shot every year.  Tetanus shot: Get a tetanus shot every 10 years.  Pneumococcal, hepatitis A, and RSV shots: Ask your care team if you need these based on your risk.  Shingles shot (for age 50 and up)  General health tests  Diabetes screening:  Starting at age 35, Get screened for diabetes at least every 3 years.  If you are younger than age 35, ask your care team if you should be screened for diabetes.  Cholesterol test: At age 39, start having a cholesterol test every 5 years, or more often if advised.  Bone density scan (DEXA): At age 50, ask your care team if you should have this scan for osteoporosis (brittle bones).  Hepatitis C: Get tested at least once in your life.  STIs (sexually  transmitted infections)  Before age 24: Ask your care team if you should be screened for STIs.  After age 24: Get screened for STIs if you're at risk. You are at risk for STIs (including HIV) if:  You are sexually active with more than one person.  You don't use condoms every time.  You or a partner was diagnosed with a sexually transmitted infection.  If you are at risk for HIV, ask about PrEP medicine to prevent HIV.  Get tested for HIV at least once in your life, whether you are at risk for HIV or not.  Cancer screening tests  Cervical cancer screening: If you have a cervix, begin getting regular cervical cancer screening tests starting at age 21.  Breast cancer scan (mammogram): If you've ever had breasts, begin having regular mammograms starting at age 40. This is a scan to check for breast cancer.  Colon cancer screening: It is important to start screening for colon cancer at age 45.  Have a colonoscopy test every 10 years (or more often if you're at risk) Or, ask your provider about stool tests like a FIT test every year or Cologuard test every 3 years.  To learn more about your testing options, visit:   .  For help making a decision, visit:   https://bit.ly/os25862.  Prostate cancer screening test: If you have a prostate, ask your care team if a prostate cancer screening test (PSA) at age 55 is right for you.  Lung cancer screening: If you are a current or former smoker ages 50 to 80, ask your care team if ongoing lung cancer screenings are right for you.  For informational purposes only. Not to replace the advice of your health care provider. Copyright   2023 [facility]. All rights reserved. Clinically reviewed by the [facility]. Open English 949261 - REV 01/24.  Substance Use Disorder: Care Instructions  Overview     You can improve your life and health by stopping your use of alcohol or drugs. When you don't drink or use drugs, you may feel and sleep better. You may  get along better with your family, friends, and coworkers. There are medicines and programs that can help with substance use disorder.  How can you care for yourself at home?  Here are some ways to help you stay sober and prevent relapse.  If you have been given medicine to help keep you sober or reduce your cravings, be sure to take it exactly as prescribed.  Talk to your doctor about programs that can help you stop using drugs or drinking alcohol.  Do not keep alcohol or drugs in your home.  Plan ahead. Think about what you'll say if other people ask you to drink or use drugs. Try not to spend time with people who drink or use drugs.  Use the time and money spent on drinking or drugs to do something that's important to you.  Preventing a relapse  Have a plan to deal with relapse. Learn to recognize changes in your thinking that lead you to drink or use drugs. Get help before you start to drink or use drugs again.  Try to stay away from situations, friends, or places that may lead you to drink or use drugs.  If you feel the need to drink alcohol or use drugs again, seek help right away. Call a trusted friend or family member. Some people get support from organizations such as Narcotics Anonymous or [employer] or from treatment facilities.  If you relapse, get help as soon as you can. Some people make a plan with another person that outlines what they want that person to do for them if they relapse. The plan usually includes how to handle the relapse and who to notify in case of relapse.  Don't give up. Remember that a relapse doesn't mean that you have failed. Use the experience to learn the triggers that lead you to drink or use drugs. Then quit again. Recovery is a lifelong process. Many people have several relapses before they are able to quit for good.  Follow-up care is a key part of your treatment and safety. Be sure to make and go to all appointments, and call your doctor if you are having problems. It's  "also a good idea to know your test results and keep a list of the medicines you take.  When should you call for help?   Call 911  anytime you think you may need emergency care. For example, call if you or someone else:    Has overdosed or has withdrawal signs. Be sure to tell the emergency workers that you are or someone else is using or trying to quit using drugs. Overdose or withdrawal signs may include:  Losing consciousness.  Seizure.  Seeing or hearing things that aren't there (hallucinations).     Is thinking or talking about suicide or harming others.   Where to get help 24 hours a day, 7 days a week   If you or someone you know talks about suicide, self-harm, a mental health crisis, a substance use crisis, or any other kind of emotional distress, get help right away. You can:    Call the Suicide and Crisis Lifeline at 988.     Call 2-080-902-TALK (1-520.800.8650).     Text HOME to 896056 to access the Crisis Text Line.   Consider saving these numbers in your phone.  Go to [employer] for more information or to chat online.  Call your doctor now or seek immediate medical care if:    You are having withdrawal symptoms. These may include nausea or vomiting, sweating, shakiness, and anxiety.   Watch closely for changes in your health, and be sure to contact your doctor if:    You have a relapse.     You need more help or support to stop.   Where can you learn more?  Go to https://www.[employer].net/patiented  Enter H573 in the search box to learn more about \"Substance Use Disorder: Care Instructions.\"  Current as of: August 20, 2024  Content Version: 14.5 2024-2025 [employer].   Care instructions adapted under license by your healthcare professional. If you have questions about a medical condition or this instruction, always ask your healthcare professional. [employer] disclaims any warranty or liability for your use of this information.       "

## 2025-07-07 NOTE — PROGRESS NOTES
"Preventive Care Visit  [facility]  Andria Morrow MD, Family Medicine  Jul 7, 2025      Assessment & Plan     Assessment & Plan  Routine general medical examination at a health care facility  -Health maintenance updated, preventive services reviewed, vaccines discussed, questions are answered, patient encouraged to continue annual wellness visits to review preventive services  - Likely gluten sensitivity rather than celiac disease, as no significant abnormalities were found during previous colonoscopy and endoscopy.  - Minimize exposure to gluten. If symptoms persist despite dietary management, consider lab work or referral to GI. Discussed the FODMAP diet as a potential strategy to manage symptoms.    Cervical cancer screening  -Smear completed today, incidental finding of polyp in cervical os which did not resolve with application of ring forceps and twisting  - Presence of polyps during perimenopause, which are typically benign but can cause bleeding.  - Await Pap smear results. Referral to an OBGYN for further evaluation and management.    Screening for diabetes mellitus  Screening for cardiovascular condition  - Return for fasting labs    Tetanus booster:  - Due for a tetanus booster.  - Administer tetanus booster today.    Shingles vaccine:  - Recommended for all adults, no risk associated with the vaccine.  - Administer shingles vaccine today.  - Risks and side effects: Shingles vaccine may cause some symptoms.    Appointments  - Schedule a fasting lab visit and shingles vaccine later this summer.    Consent was obtained from the patient to use an AI documentation tool in the creation of this note.            BMI  Estimated body mass index is 29.38 kg/m  as calculated from the following:    Height as of this encounter: 1.613 m (5' 3.5\").    Weight as of this encounter: 76.4 kg (168 lb 8 oz).       Counseling  Appropriate preventive services were addressed with this patient via " screening, questionnaire, or discussion as appropriate for fall prevention, nutrition, physical activity, Tobacco-use cessation, social engagement, weight loss and cognition.  Checklist reviewing preventive services available has been given to the patient.  Reviewed patient's diet, addressing concerns and/or questions.             Master Delacruz is a 54 year old, presenting for the following:  Physical (Fasting - ) and Blood Draw (Gluten - Sensitivity, unable to eat breaks out into hives. )        7/7/2025     8:08 AM   Additional Questions   Roomed by Bernadette BHARDWAJ CMA   Accompanied by None      Here for annual exam  History of Present Illness-  Nubia Cerrato, 54 years    Gluten Sensitivity / Gastrointestinal Symptoms  - Longstanding issues with gluten-containing foods, including pain and gastrointestinal symptoms  - Noted gluten sensitivity for years, unsure of specific diagnosis (celiac disease vs. non-celiac gluten sensitivity)  - Avoids gluten and beer due to symptoms  - No prior formal testing for celiac disease reported; previous providers did not suggest or perform testing  - Colonoscopies performed at [facility], including one on May 15, 2017; no significant abnormality found on colon biopsy, no upper endoscopy biopsies performed  - Reports cramping and gastrointestinal discomfort with certain vegetables (e.g., broccoli), finds overcooked vegetables easier to tolerate    Ulcerative Colitis  - Diagnosed in her 20s but colonoscopies since that time have been normal  - History of colonoscopies for evaluation    Cholecystectomy and Bile Duct Surgery  - Underwent bile duct surgery and cholecystectomy at age 20 due to slow/non-functioning bile ducts    Menstrual Irregularities / Perimenopausal Symptoms  - History of very heavy period a couple of years ago, followed by cessation of regular periods  - Recent pattern of having a period every other month, now very light  - Last episode of light  bleeding in March 2025  - Denies cramping, hot flashes, or other perimenopausal symptoms  - Reports feeling well overall        Advance Care Planning    Discussed advance care planning with patient; however, patient declined at this time.        7/6/2025   General Health   How would you rate your overall physical health? Good   Feel stress (tense, anxious, or unable to sleep) Not at all         7/6/2025   Nutrition   Three or more servings of calcium each day? Yes   Diet: Gluten-free/reduced   How many servings of fruit and vegetables per day? (!) 0-1   How many sweetened beverages each day? 0-1         7/6/2025   Exercise   Days per week of moderate/strenous exercise 0 days   (!) EXERCISE CONCERN      7/6/2025   Social Factors   Frequency of gathering with friends or relatives Once a week   Worry food won't last until get money to buy more No   Food not last or not have enough money for food? No   Do you have housing? (Housing is defined as stable permanent housing and does not include staying outside in a car, in a tent, in an abandoned building, in an overnight shelter, or couch-surfing.) Yes   Are you worried about losing your housing? No   Lack of transportation? No   Unable to get utilities (heat,electricity)? No         7/6/2025   Fall Risk   Fallen 2 or more times in the past year? No   Trouble with walking or balance? No          7/6/2025   Dental   Dentist two times every year? Yes       Today's PHQ-9 Score:       7/6/2025     9:32 PM   PHQ-9 SCORE   PHQ-9 Total Score MyChart 0   PHQ-9 Total Score 0        Patient-reported         7/6/2025   Substance Use   Alcohol more than 3/day or more than 7/wk No   Do you use any other substances recreationally? (!) CANNABIS PRODUCTS     Social History     Tobacco Use    Smoking status: Never    Smokeless tobacco: Never   Vaping Use    Vaping status: Some Days    Substances: Nicotine    Devices: Disposable   Substance Use Topics    Alcohol use: Yes    Drug use: Never  "          6/5/2025   LAST FHS-7 RESULTS   1st degree relative breast or ovarian cancer No   Any relative bilateral breast cancer No   Any ONE woman have BOTH breast AND ovarian cancer No   Any woman with breast cancer before 50yrs No   2 or more relatives with breast AND/OR ovarian cancer No   2 or more relatives with breast AND/OR bowel cancer No                  7/6/2025   One time HIV Screening   Previous HIV test? No         7/6/2025   STI Screening   New sexual partner(s) since last STI/HIV test? No     History of abnormal Pap smear: No - age 30- 64 PAP with HPV every 5 years recommended       ASCVD Risk   The ASCVD Risk score (Nayana DOVE, et al., 2019) failed to calculate for the following reasons:    Cannot find a previous HDL lab    Cannot find a previous total cholesterol lab           Reviewed and updated as needed this visit by Provider   Tobacco  Allergies  Meds  Problems  Med Hx  Surg Hx  Fam Hx                     Objective    Exam  BP 96/56   Pulse 77   Temp 97.6  F (36.4  C)   Resp 12   Ht 1.613 m (5' 3.5\")   Wt 76.4 kg (168 lb 8 oz)   LMP 03/18/2025 (Within Days)   SpO2 98%   BMI 29.38 kg/m     Estimated body mass index is 29.38 kg/m  as calculated from the following:    Height as of this encounter: 1.613 m (5' 3.5\").    Weight as of this encounter: 76.4 kg (168 lb 8 oz).    Physical Exam  GENERAL: alert and no distress  EYES: Eyes grossly normal to inspection, PERRL and conjunctivae and sclerae normal  HENT: ear canals and TM's normal, nose and mouth without ulcers or lesions  NECK: no adenopathy, no asymmetry, masses, or scars  RESP: lungs clear to auscultation - no rales, rhonchi or wheezes  CV: regular rate and rhythm, normal S1 S2, no S3 or S4, no murmur, click or rub, no peripheral edema  ABDOMEN: soft, nontender, no hepatosplenomegaly, no masses and bowel sounds normal   (female) w/bimanual: normal female external genitalia, normal urethral meatus, normal vaginal " mucosa, and normal adnexa/uterus without masses or discharge, cervix is midline with presence of 9 pink polyp through cervical os, this is grasped with a ring forceps and twisted but could not be removed, mild spotting noted after attempted removal  MS: no gross musculoskeletal defects noted, no edema  SKIN: no suspicious lesions or rashes  NEURO: Normal strength and tone, mentation intact and speech normal  PSYCH: mentation appears normal, affect normal/bright        Signed Electronically by: Andria Morrow MD

## 2025-07-08 LAB
HPV HR 12 DNA CVX QL NAA+PROBE: NEGATIVE
HPV16 DNA CVX QL NAA+PROBE: NEGATIVE
HPV18 DNA CVX QL NAA+PROBE: NEGATIVE
HUMAN PAPILLOMA VIRUS FINAL DIAGNOSIS: NORMAL

## 2025-07-09 LAB
BKR AP ASSOCIATED HPV REPORT: NORMAL
BKR LAB AP GYN ADEQUACY: NORMAL
BKR LAB AP GYN INTERPRETATION: NORMAL
BKR LAB AP LMP: NORMAL
BKR LAB AP PREVIOUS ABNORMAL: NORMAL
PATH REPORT.COMMENTS IMP SPEC: NORMAL
PATH REPORT.COMMENTS IMP SPEC: NORMAL
PATH REPORT.RELEVANT HX SPEC: NORMAL